# Patient Record
Sex: MALE | URBAN - METROPOLITAN AREA
[De-identification: names, ages, dates, MRNs, and addresses within clinical notes are randomized per-mention and may not be internally consistent; named-entity substitution may affect disease eponyms.]

---

## 2024-10-23 ENCOUNTER — TELEPHONE (OUTPATIENT)
Dept: GASTROENTEROLOGY | Facility: CLINIC | Age: 64
End: 2024-10-23

## 2024-10-23 NOTE — TELEPHONE ENCOUNTER
Informed patient spouse that patient will need to come by office to sign a record release in order for us to release billing information to him.     Also checking on date of last colonoscopy. BF

## 2024-10-23 NOTE — TELEPHONE ENCOUNTER
----- Message from Aura sent at 10/23/2024 11:25 AM CDT -----  Contact: Kim Alvarez is calling in regards to she says that her  was a patient of . She states that she will like to know when was his  last colonoscopy and also to receive a copy of his itemized statement  She will like a call back at [phone]